# Patient Record
Sex: MALE | Race: WHITE | NOT HISPANIC OR LATINO | URBAN - METROPOLITAN AREA
[De-identification: names, ages, dates, MRNs, and addresses within clinical notes are randomized per-mention and may not be internally consistent; named-entity substitution may affect disease eponyms.]

---

## 2018-03-23 NOTE — PATIENT DISCUSSION
Durezol 0.05% 1 gtt OS BID x 1 week, then QD x 1 week. Prescribed proactively at Pt request as she is experiencing symptoms that per hx result in acute attack.

## 2018-08-15 NOTE — PATIENT DISCUSSION
Begin Durezol tid OS x 1 week, BID x 1 week, QD x 1 week, then change PF 1% BID x 2 weeks, then QD x 2 weeks.

## 2021-01-04 NOTE — PATIENT DISCUSSION
Received Choice form at 1043  Agency/Facility Name: [facility]  Referral sent per Choice form @ 1056     @1215  [facility] declined, they are out of O2.    @1230  Received Choice form at 1043  Agency/Facility Name: [facility]  Referral sent per Choice form @ 1230     @1420  Refaxed again when [facility] said they did not get the last fax. [phone]    @6223  Gera from [facility] said they are still in review and they will call as soon as they are finished.     Recommended NP artificial tears to use: 1 drop 4x a day in both eyes.

## 2021-11-01 ENCOUNTER — NEW PATIENT (OUTPATIENT)
Dept: URBAN - METROPOLITAN AREA CLINIC 9 | Facility: CLINIC | Age: 31
End: 2021-11-01

## 2021-11-01 DIAGNOSIS — H43.813: ICD-10-CM

## 2021-11-01 DIAGNOSIS — H04.123: ICD-10-CM

## 2021-11-01 DIAGNOSIS — H16.223: ICD-10-CM

## 2021-11-01 PROCEDURE — 92134 CPTRZ OPH DX IMG PST SGM RTA: CPT

## 2021-11-01 PROCEDURE — 92004 COMPRE OPH EXAM NEW PT 1/>: CPT

## 2021-11-01 RX ORDER — LIFITEGRAST 50 MG/ML: 1 SOLUTION/ DROPS OPHTHALMIC TWICE A DAY

## 2021-11-01 ASSESSMENT — VISUAL ACUITY
OS_SC: 20/25
OD_SC: 20/25-2

## 2021-11-01 ASSESSMENT — TONOMETRY
OD_IOP_MMHG: 16
OS_IOP_MMHG: 17

## 2022-06-22 NOTE — PATIENT DISCUSSION
No inflammation seen today and IOP at target. Recommend pt to use a glc med on a regular basis to prevent further visual field loss. Offered pt to use timolol QAM in place of Combigan. Pt. to continue to follow with Dr Sadia Tapia.

## 2022-12-08 ENCOUNTER — ESTABLISHED PATIENT (OUTPATIENT)
Dept: URBAN - METROPOLITAN AREA CLINIC 9 | Facility: CLINIC | Age: 32
End: 2022-12-08

## 2022-12-08 PROCEDURE — 92015 DETERMINE REFRACTIVE STATE: CPT

## 2022-12-08 PROCEDURE — 92014 COMPRE OPH EXAM EST PT 1/>: CPT

## 2022-12-08 ASSESSMENT — KERATOMETRY
OD_K1POWER_DIOPTERS: 39.25
OS_AXISANGLE_DEGREES: 012
OD_K2POWER_DIOPTERS: 40.75
OD_AXISANGLE2_DEGREES: 83
OS_AXISANGLE2_DEGREES: 102
OD_AXISANGLE_DEGREES: 173
OS_K1POWER_DIOPTERS: 39.50
OS_K2POWER_DIOPTERS: 40.50

## 2022-12-08 ASSESSMENT — TONOMETRY
OS_IOP_MMHG: 13
OD_IOP_MMHG: 13

## 2022-12-08 ASSESSMENT — VISUAL ACUITY
OS_SC: 20/20
OU_SC: 20/20
OD_SC: 20/20-2

## 2023-12-12 ENCOUNTER — ESTABLISHED PATIENT (OUTPATIENT)
Facility: LOCATION | Age: 33
End: 2023-12-12

## 2023-12-12 DIAGNOSIS — H43.392: ICD-10-CM

## 2023-12-12 DIAGNOSIS — H04.123: ICD-10-CM

## 2023-12-12 DIAGNOSIS — H10.45: ICD-10-CM

## 2023-12-12 DIAGNOSIS — H52.223: ICD-10-CM

## 2023-12-12 DIAGNOSIS — H43.811: ICD-10-CM

## 2023-12-12 PROCEDURE — 92014 COMPRE OPH EXAM EST PT 1/>: CPT

## 2023-12-12 PROCEDURE — 92015 DETERMINE REFRACTIVE STATE: CPT

## 2023-12-12 ASSESSMENT — KERATOMETRY
OD_K2POWER_DIOPTERS: 40.75
OS_AXISANGLE2_DEGREES: 103
OS_K2POWER_DIOPTERS: 40.50
OD_AXISANGLE_DEGREES: 176
OS_AXISANGLE_DEGREES: 13
OD_AXISANGLE2_DEGREES: 86
OD_K1POWER_DIOPTERS: 39.00
OS_K1POWER_DIOPTERS: 39.50

## 2023-12-12 ASSESSMENT — TONOMETRY
OS_IOP_MMHG: 12
OD_IOP_MMHG: 12

## 2023-12-12 ASSESSMENT — VISUAL ACUITY
OD_SC: 20/30
OU_SC: 20/30+1
OS_SC: 20/30+2
